# Patient Record
Sex: FEMALE | Race: WHITE | NOT HISPANIC OR LATINO | URBAN - METROPOLITAN AREA
[De-identification: names, ages, dates, MRNs, and addresses within clinical notes are randomized per-mention and may not be internally consistent; named-entity substitution may affect disease eponyms.]

---

## 2021-07-27 ENCOUNTER — INPATIENT (INPATIENT)
Facility: HOSPITAL | Age: 26
LOS: 0 days | Discharge: HOME | DRG: 607 | End: 2021-07-28
Attending: STUDENT IN AN ORGANIZED HEALTH CARE EDUCATION/TRAINING PROGRAM | Admitting: STUDENT IN AN ORGANIZED HEALTH CARE EDUCATION/TRAINING PROGRAM
Payer: COMMERCIAL

## 2021-07-27 VITALS
WEIGHT: 139.99 LBS | RESPIRATION RATE: 18 BRPM | HEART RATE: 91 BPM | SYSTOLIC BLOOD PRESSURE: 138 MMHG | HEIGHT: 65 IN | OXYGEN SATURATION: 98 % | DIASTOLIC BLOOD PRESSURE: 92 MMHG | TEMPERATURE: 98 F

## 2021-07-27 DIAGNOSIS — T78.40XA ALLERGY, UNSPECIFIED, INITIAL ENCOUNTER: ICD-10-CM

## 2021-07-27 DIAGNOSIS — E89.0 POSTPROCEDURAL HYPOTHYROIDISM: ICD-10-CM

## 2021-07-27 DIAGNOSIS — R63.8 OTHER SYMPTOMS AND SIGNS CONCERNING FOOD AND FLUID INTAKE: ICD-10-CM

## 2021-07-27 DIAGNOSIS — R21 RASH AND OTHER NONSPECIFIC SKIN ERUPTION: ICD-10-CM

## 2021-07-27 DIAGNOSIS — E89.0 POSTPROCEDURAL HYPOTHYROIDISM: Chronic | ICD-10-CM

## 2021-07-27 LAB
ANION GAP SERPL CALC-SCNC: 11 MMOL/L — SIGNIFICANT CHANGE UP (ref 9–16)
BASOPHILS # BLD AUTO: 0.02 K/UL — SIGNIFICANT CHANGE UP (ref 0–0.2)
BASOPHILS NFR BLD AUTO: 0.3 % — SIGNIFICANT CHANGE UP (ref 0–2)
BUN SERPL-MCNC: 7 MG/DL — SIGNIFICANT CHANGE UP (ref 7–23)
CALCIUM SERPL-MCNC: 9.7 MG/DL — SIGNIFICANT CHANGE UP (ref 8.5–10.5)
CHLORIDE SERPL-SCNC: 104 MMOL/L — SIGNIFICANT CHANGE UP (ref 96–108)
CO2 SERPL-SCNC: 26 MMOL/L — SIGNIFICANT CHANGE UP (ref 22–31)
CREAT SERPL-MCNC: 0.68 MG/DL — SIGNIFICANT CHANGE UP (ref 0.5–1.3)
EOSINOPHIL # BLD AUTO: 0.02 K/UL — SIGNIFICANT CHANGE UP (ref 0–0.5)
EOSINOPHIL NFR BLD AUTO: 0.3 % — SIGNIFICANT CHANGE UP (ref 0–6)
GLUCOSE SERPL-MCNC: 194 MG/DL — HIGH (ref 70–99)
HCG UR QL: NEGATIVE — SIGNIFICANT CHANGE UP
HCT VFR BLD CALC: 42.5 % — SIGNIFICANT CHANGE UP (ref 34.5–45)
HGB BLD-MCNC: 14.4 G/DL — SIGNIFICANT CHANGE UP (ref 11.5–15.5)
IMM GRANULOCYTES NFR BLD AUTO: 0.6 % — SIGNIFICANT CHANGE UP (ref 0–1.5)
LYMPHOCYTES # BLD AUTO: 0.54 K/UL — LOW (ref 1–3.3)
LYMPHOCYTES # BLD AUTO: 7.9 % — LOW (ref 13–44)
MCHC RBC-ENTMCNC: 31.9 PG — SIGNIFICANT CHANGE UP (ref 27–34)
MCHC RBC-ENTMCNC: 33.9 GM/DL — SIGNIFICANT CHANGE UP (ref 32–36)
MCV RBC AUTO: 94 FL — SIGNIFICANT CHANGE UP (ref 80–100)
MONOCYTES # BLD AUTO: 0.07 K/UL — SIGNIFICANT CHANGE UP (ref 0–0.9)
MONOCYTES NFR BLD AUTO: 1 % — LOW (ref 2–14)
NEUTROPHILS # BLD AUTO: 6.17 K/UL — SIGNIFICANT CHANGE UP (ref 1.8–7.4)
NEUTROPHILS NFR BLD AUTO: 89.9 % — HIGH (ref 43–77)
NRBC # BLD: 0 /100 WBCS — SIGNIFICANT CHANGE UP (ref 0–0)
PLATELET # BLD AUTO: 214 K/UL — SIGNIFICANT CHANGE UP (ref 150–400)
POTASSIUM SERPL-MCNC: 4 MMOL/L — SIGNIFICANT CHANGE UP (ref 3.5–5.3)
POTASSIUM SERPL-SCNC: 4 MMOL/L — SIGNIFICANT CHANGE UP (ref 3.5–5.3)
RBC # BLD: 4.52 M/UL — SIGNIFICANT CHANGE UP (ref 3.8–5.2)
RBC # FLD: 11.5 % — SIGNIFICANT CHANGE UP (ref 10.3–14.5)
SARS-COV-2 RNA SPEC QL NAA+PROBE: SIGNIFICANT CHANGE UP
SODIUM SERPL-SCNC: 141 MMOL/L — SIGNIFICANT CHANGE UP (ref 132–145)
WBC # BLD: 6.86 K/UL — SIGNIFICANT CHANGE UP (ref 3.8–10.5)
WBC # FLD AUTO: 6.86 K/UL — SIGNIFICANT CHANGE UP (ref 3.8–10.5)

## 2021-07-27 PROCEDURE — 99285 EMERGENCY DEPT VISIT HI MDM: CPT

## 2021-07-27 PROCEDURE — 70490 CT SOFT TISSUE NECK W/O DYE: CPT | Mod: 26

## 2021-07-27 PROCEDURE — 99223 1ST HOSP IP/OBS HIGH 75: CPT | Mod: GC

## 2021-07-27 RX ORDER — LEVOTHYROXINE SODIUM 125 MCG
125 TABLET ORAL DAILY
Refills: 0 | Status: DISCONTINUED | OUTPATIENT
Start: 2021-07-28 | End: 2021-07-28

## 2021-07-27 RX ORDER — ENOXAPARIN SODIUM 100 MG/ML
40 INJECTION SUBCUTANEOUS EVERY 24 HOURS
Refills: 0 | Status: DISCONTINUED | OUTPATIENT
Start: 2021-07-27 | End: 2021-07-28

## 2021-07-27 RX ORDER — PANTOPRAZOLE SODIUM 20 MG/1
40 TABLET, DELAYED RELEASE ORAL
Refills: 0 | Status: DISCONTINUED | OUTPATIENT
Start: 2021-07-27 | End: 2021-07-28

## 2021-07-27 RX ORDER — FAMOTIDINE 10 MG/ML
20 INJECTION INTRAVENOUS ONCE
Refills: 0 | Status: COMPLETED | OUTPATIENT
Start: 2021-07-27 | End: 2021-07-27

## 2021-07-27 RX ORDER — DIPHENHYDRAMINE HCL 50 MG
25 CAPSULE ORAL ONCE
Refills: 0 | Status: COMPLETED | OUTPATIENT
Start: 2021-07-27 | End: 2021-07-27

## 2021-07-27 RX ORDER — LEVOTHYROXINE SODIUM 125 MCG
0 TABLET ORAL
Qty: 0 | Refills: 0 | DISCHARGE

## 2021-07-27 RX ORDER — LEVOTHYROXINE SODIUM 125 MCG
125 TABLET ORAL
Qty: 0 | Refills: 0 | DISCHARGE

## 2021-07-27 RX ORDER — EPINEPHRINE 0.3 MG/.3ML
0.3 INJECTION INTRAMUSCULAR; SUBCUTANEOUS ONCE
Refills: 0 | Status: COMPLETED | OUTPATIENT
Start: 2021-07-27 | End: 2021-07-27

## 2021-07-27 RX ORDER — HYDROXYZINE HCL 10 MG
25 TABLET ORAL EVERY 6 HOURS
Refills: 0 | Status: DISCONTINUED | OUTPATIENT
Start: 2021-07-27 | End: 2021-07-27

## 2021-07-27 RX ORDER — HYDROXYZINE HCL 10 MG
25 TABLET ORAL EVERY 6 HOURS
Refills: 0 | Status: DISCONTINUED | OUTPATIENT
Start: 2021-07-27 | End: 2021-07-28

## 2021-07-27 RX ORDER — SODIUM CHLORIDE 9 MG/ML
1000 INJECTION INTRAMUSCULAR; INTRAVENOUS; SUBCUTANEOUS ONCE
Refills: 0 | Status: COMPLETED | OUTPATIENT
Start: 2021-07-27 | End: 2021-07-27

## 2021-07-27 RX ADMIN — Medication 25 MILLIGRAM(S): at 19:31

## 2021-07-27 RX ADMIN — Medication 1 APPLICATION(S): at 23:26

## 2021-07-27 RX ADMIN — EPINEPHRINE 0.3 MILLIGRAM(S): 0.3 INJECTION INTRAMUSCULAR; SUBCUTANEOUS at 04:25

## 2021-07-27 RX ADMIN — Medication 25 MILLIGRAM(S): at 00:54

## 2021-07-27 RX ADMIN — ENOXAPARIN SODIUM 40 MILLIGRAM(S): 100 INJECTION SUBCUTANEOUS at 23:25

## 2021-07-27 RX ADMIN — Medication 25 MILLIGRAM(S): at 17:12

## 2021-07-27 RX ADMIN — Medication 50 MILLIGRAM(S): at 00:55

## 2021-07-27 RX ADMIN — SODIUM CHLORIDE 1000 MILLILITER(S): 9 INJECTION INTRAMUSCULAR; INTRAVENOUS; SUBCUTANEOUS at 00:54

## 2021-07-27 RX ADMIN — SODIUM CHLORIDE 1000 MILLILITER(S): 9 INJECTION INTRAMUSCULAR; INTRAVENOUS; SUBCUTANEOUS at 01:55

## 2021-07-27 RX ADMIN — Medication 50 MILLIGRAM(S): at 05:21

## 2021-07-27 RX ADMIN — FAMOTIDINE 20 MILLIGRAM(S): 10 INJECTION INTRAVENOUS at 00:54

## 2021-07-27 RX ADMIN — Medication 25 MILLIGRAM(S): at 02:49

## 2021-07-27 NOTE — ED ADULT NURSE REASSESSMENT NOTE - NS ED NURSE REASSESS COMMENT FT1
At reassessment, patient has clear airway, in NAD. Persistent uticaria with hives, 25mg benadryl IV administered. Will continue to monitor.

## 2021-07-27 NOTE — H&P ADULT - HISTORY OF PRESENT ILLNESS
CC: "rash"  HPI: 26F PMH thyroid cancer s/p resection (2016) and RT p/w rash x4 days and difficulty breathing x1 day. Rash started on Friday after pt took 4x 250 mg tablets of leftover azithromycin for patches on her L eyelid and R upper lip that she thought was orofacial dermatitis. Rash started out as asymptomatic skin-colored papules on her arms and thighs, which then progressively increased in number and became more raised and red. Rash spread to chest and abdomen Sunday night/Monday and became itchy. Pt also felt "fullness" in her throat, no difficulty breathing on Monday night, so she went to urgent care, where she received a steroid shot, 6-day pack of steroids, which she started Monday night, and benadryl. Rash improved but then recurred, so pt went to [facility] ED. Pt denies any drug or food allergies, previously took azithromycin without problems. No history of similar rash or difficulty breathing. Moved to [facility] from [place] in June, denies any recent travel or outdoor exposure. No new lotions or detergent, medications, or supplements. Ate pasta w/ shrimp last night, turkey wrap on Friday, denies any new or unusual foods.     ROS notable for 1 episode of diarrhea on Friday after taking abx, last BM earlier today was normal. Also notes feeling sore on Thursday and Friday, took advil, no soreness yesterday. No joint pains.    Denies fevers, chills, cough, chest pain, dyspnea, nausea, headache, sick contacts, change in urinary habits.      In the ED:    - VS: Tmax: 36.8, HR: 91, BP: 138/92, RR: 18, O2: 98% on RA     - Pertinent Labs: Glc 194    - Imaging: CT neck soft tissue: normal exam    - Treatment/interventions: Pt received benadryl x2, famotidine, epipen x1, and solumedrol 50 mg x2 in ED.    PMHx:   PSHx:  Meds: See med rec  Allergies:  Social: see below     CC: "rash"  HPI: 26F PMH thyroid cancer s/p resection (2016) and RT p/w rash x4 days and difficulty breathing x1 day. Rash started on Friday after pt took 4x 250 mg tablets of leftover azithromycin for patches on her L eyelid and R upper lip that she thought was orofacial dermatitis. Rash started out as asymptomatic skin-colored papules on her arms and thighs, which then progressively increased in number and became more raised and red. Rash spread to chest and abdomen Sunday night/Monday and became itchy. Pt also felt "fullness" in her throat, no difficulty breathing on Monday night, so she went to urgent care, where she received a steroid shot, 6-day pack of steroids, which she started Monday night, and benadryl. Rash improved but then recurred, so pt went to [facility] ED. Pt denies any drug or food allergies, previously took azithromycin without problems. No history of similar rash or difficulty breathing. Moved to [facility] from [place] in June, denies any recent travel or outdoor exposure. No new lotions or detergent, medications, or supplements. Ate pasta w/ shrimp last night, turkey wrap on Friday, denies any new or unusual foods.     ROS notable for 1 episode of diarrhea on Friday after taking abx, last BM earlier today was normal. Also notes feeling sore on Thursday and Friday, took advil, no soreness yesterday. No joint pains.    Denies fevers, chills, cough, chest pain, dyspnea, nausea, headache, sick contacts, change in urinary habits.      In the ED:    - VS: Tmax: 36.8, HR: 91, BP: 138/92, RR: 18, O2: 98% on RA     - Pertinent Labs: Glc 194    - Imaging: CT neck soft tissue: normal exam    - Treatment/interventions: Pt received benadryl x2, famotidine, epipen x1, solumedrol 50 mg x2, and 1L NS in ED.    PMHx:   PSHx:  Meds: See med rec  Allergies:  Social: see below

## 2021-07-27 NOTE — ED PROVIDER NOTE - ENMT, MLM
Airway patent, Nasal mucosa clear. Mouth with normal mucosa. Throat has no vesicles, no oropharyngeal exudates and uvula is midline. no angioedema

## 2021-07-27 NOTE — ED ADULT NURSE REASSESSMENT NOTE - GENERAL PATIENT STATE
comfortable appearance/cooperative/resting/sleeping
comfortable appearance/cooperative/resting/sleeping
cooperative

## 2021-07-27 NOTE — ED ADULT NURSE REASSESSMENT NOTE - NS ED NURSE REASSESS COMMENT FT1
Pt reporting relief from pruritus, redness noted to have decreased to hives to b/l feet and BUE. Pt reporting relief from pruritus, redness noted to have decreased to hives to b/l feet and BUE's.

## 2021-07-27 NOTE — CONSULT NOTE ADULT - ASSESSMENT
A/p: Suspect erythema multiforme like drug reaction 2/2 large dose of azithromycin  -Would check LFTs to r/o DRESS, though less likely as no facial swelling and no eosinophilia on CBC  -Would rec check Mycoplasma pneumoniae antibodies, IgG, IgM, as patient described body aches and malaise prior to onset of rash and this has been associated with EM like outbreaks.   -Would Rx 1 lb jar of triamcinolone, apply to affected area BID x 1 week. Avoid face, groin, underarms. Can continue pepcid and benadryl for a few days.   -Avoid ibuprofen and azithromycin going forward, consider allergy referral for ibuprofen in the future.   -Call for reevaluation if mucous membrane involvement, progression/worsening of rash, pain in skin.   -Can f/u with derm as outpatient.

## 2021-07-27 NOTE — ED ADULT NURSE REASSESSMENT NOTE - NS ED NURSE REASSESS COMMENT FT1
Pt aox3 breathing is even and unlabored. hives present on b/l arms, neck and chest. Pt denies throat discomfort.

## 2021-07-27 NOTE — H&P ADULT - PROBLEM SELECTOR PLAN 1
Unknown inciting agent, most likely azithromycin, as pt's rash started after she took her remaining 4 pills. Pt's throat symptoms started 3 days after rash, unclear if related or separate inciting agent.   -s/p IM steroids and 40 mg medrol last night at urgent care   -s/p 50 mg solumedrol x2, epipen x1, IV benedryl x2 in ED    #Rash - most likely drug-induced given clinical history. Not urticaria, as rash has persisted beyond 24 hrs. Other possible etiologies include viral exanthem vs less likely contact dermatitis.   -hydroxyzine 25 mg q6h for itching  -consider topical triamcinolone 0.025% cream 1-2 times a day PRN itching Unknown inciting agent, most likely azithromycin, as pt's rash started after she took her remaining 4 pills. Pt's throat symptoms started 3 days after rash, unclear if related or separate inciting agent.   -s/p IM steroids and 40 mg medrol last night at urgent care   -s/p 50 mg solumedrol x2, epipen x1, IV benedryl x2 in ED  -c/w 40 mg PO medrol for 3 days (total 4d)    #Rash - most likely drug-induced given clinical history. Not urticaria, as rash has persisted beyond 24 hrs. Other possible etiologies include viral exanthem vs less likely contact dermatitis.   -hydroxyzine 25 mg q6h for itching  -consider topical triamcinolone 0.025% cream 1-2 times a day PRN itching Unknown inciting agent, most likely azithromycin, as pt's rash started after she took her remaining 4 pills. Pt's throat symptoms started 3 days after rash, unclear if related or separate inciting agent.   -s/p IM steroids and 40 mg medrol last night at urgent care   -s/p 50 mg solumedrol x2, epipen x1, IV benedryl x2 in ED  -c/w 40 mg PO medrol for 3 days (total 4d)    #Rash - most likely drug-induced given clinical history. Not urticaria, as rash has persisted beyond 24 hrs. Other possible etiologies include viral exanthem vs less likely contact dermatitis.   -hydroxyzine 25 mg q6h for itching  -derm consulted, f/u derm recs

## 2021-07-27 NOTE — ED ADULT NURSE NOTE - CHIEF COMPLAINT QUOTE
Pt has no hx known allergies, reports taking unprescribed azithromycin at home Friday for "dry spot" to right eye, began to have body hives, went to  today, given prednisone, and unknown steroid. Took benadryl at home. States hives improved, then worsened after eating dinner. Pt admits to airway "tightness", denies chest pain. Able to swallow and speak w/out difficulty. No drooling noted.

## 2021-07-27 NOTE — ED ADULT NURSE NOTE - OBJECTIVE STATEMENT
Pt has no hx known allergies, reports taking unprescribed azithromycin at home Friday for "dry spot" to right eye, began to have body hives, went to  today, given prednisone, and unknown steroid. Took benadryl at home. States hives improved, then worsened after eating dinner. Hives noted to all extremities, chest and abdomin. Pt admits to airway "tightness", denies chest pain. Able to swallow and speak w/out difficulty. No drooling noted.

## 2021-07-27 NOTE — H&P ADULT - NSHPSOCIALHISTORY_GEN_ALL_CORE
Single, never , not currently seeing anyone. Moved to [facility] in June 2021. Works at [employer] as a . Never smoker, drinks 6-8 drinks/wk mostly on Friday and Saturday. Denies current and past use of other recreational drugs. Single, never , not currently seeing anyone. Moved to [facility] in June 2021. Works at [employer] as a . Never smoker, drinks 6-8 drinks/wk mostly on Friday and Saturday. Denies current and past use of other recreational drugs. Has not been sexually active in some time.

## 2021-07-27 NOTE — ED PROVIDER NOTE - OBJECTIVE STATEMENT
25 yo F, no pmhx, presents with diffuse hives, itching rash, and throat discomfort after eating dinner this evening. patient was seen at urgent care at noon yesterday and given IM injection of steroid, and medrol dose pack which she started this evening, and benadryl. hives had subsided but then returned. notes mild itchiness, and fullness with discomfort to throat. denies N/V/D, denies sob or syncope. denies wheezing. denies similar reactions in the past.

## 2021-07-27 NOTE — H&P ADULT - NSICDXFAMILYHX_GEN_ALL_CORE_FT
FAMILY HISTORY:  Father  Still living? Yes, Estimated age: Age Unknown  FH: lymphoma, Age at diagnosis: Age Unknown    Mother  Still living? Yes, Estimated age: Age Unknown  FH: thyroid cancer, Age at diagnosis: Age Unknown    Grandparent  Still living? No  FH: breast cancer, Age at diagnosis: Age Unknown    Aunt  Still living? Unknown  FH: thyroid cancer, Age at diagnosis: Age Unknown

## 2021-07-27 NOTE — H&P ADULT - ASSESSMENT
26F PMH thyroid cancer s/p resection (2016) and RT, no history of allergic reactions p/w rash x4 days and difficulty breathing x1 day.  26F PMH thyroid cancer s/p resection (2016) and RT, no history of allergic reaction p/w rash x4 days and difficulty breathing x1 day.

## 2021-07-27 NOTE — ED PROVIDER NOTE - PROGRESS NOTE DETAILS
patient with persistant hives, and worsening of allergic reaction symtpoms, no airway involvement at this time. accepted to [facility] spoke to telemedicine consultation as transfer center recommended tele med consult there is no bed for 4 hours, no hospitalist available at this time. patient with persistent hives, and worsening of allergic reaction symptoms, no airway involvement at this time. accepted to [facility] pt stable -  waiting for transport to [facility]

## 2021-07-27 NOTE — H&P ADULT - ATTENDING COMMENTS
Patient was seen and examined with the resident team today.  I agree with Dr. You's assessment and plan with the following exceptions/additions:     Briefly, this is a 25yo woman with a PMH of thyroid cancer s/p resection and RT (2016) who p/w a pruritic, maculopapular rash after consuming 1g of Azithromycin out of concerns of an R-eyelid, L-upper lip dermatitis.  Initially she presented to the ED and was discharged with steroids but return after worsening of her rash associated with throat tingling.  [facility]  Admitted for supportive care.     #Drug Rash - steroids and benadryl; can substitute benadryl for Atarax if pt does not find relief with the former; please list Azithro as an official allergy and refer to an Allergist for confirmation; f/u official Derm recs  #Hx of thyroid CA - family trait; c/w home Synthroid   #DVT PPx - Lovenox  #Dispo - home likely tomorrow    Kami Jansen  [phone]

## 2021-07-27 NOTE — CONSULT NOTE ADULT - SUBJECTIVE AND OBJECTIVE BOX
S: Asked to consult on this 25 yo F who was admitted on 7/27 for a generalized rash and concern for breathing difficulties.   Pt states that on thursday, friday of last week she had some body aches and took advil, which she was used to taking approx 1x a month for generalized aches and pains.   She then noticed some scaling above her lip and on her eyebrow on friday, and took 1000 mg (250 x 4) azithromycin, which she had at home, in concern for periorificial dermatitis.  She began developing a rash on saturday initially seen at urgent care, given IMK and then presented to [facility], thinks she felt some fullness in her throat, admitted approx midnight on monday night.   Given IV solumedrol, pepcid, epipen, benadryl.   Pt states she is not sure she had any breathing difficulties. Currently feels ok, skin feels somewhat swollen/warm but not painful. Denies difficulty swallowing, pain on urination, pain with blinking, pain with defecation.       O:   WDWN, A03, appears stated age, tearful in bed.   Pt with erythematous blanchable, coalescing thin papules and plaques on the trunk and extremities, sparing the face  Some skin lesions appear targetoid and her palms and soles are affected.   no e/o eosinophilia on CBC

## 2021-07-27 NOTE — ED ADULT NURSE NOTE - NSIMPLEMENTINTERV_GEN_ALL_ED
Implemented All Universal Safety Interventions:  [place] to call system. Call bell, personal items and telephone within reach. Instruct patient to call for assistance. Room bathroom lighting operational. Non-slip footwear when patient is off stretcher. Physically safe environment: no spills, clutter or unnecessary equipment. Stretcher in lowest position, wheels locked, appropriate side rails in place.

## 2021-07-27 NOTE — ED ADULT NURSE NOTE - CHPI ED NUR SYMPTOMS NEG
no congestion/no difficulty breathing/no difficulty swallowing/no nausea/no rash/no shortness of breath/no swelling of face, tongue/no vomiting/no wheezing

## 2021-07-27 NOTE — H&P ADULT - NSHPPHYSICALEXAM_GEN_ALL_CORE
GENERAL: female appearing her age lying in bed, appears somewhat uncomfortable but NAD  HEAD:  Atraumatic, Normocephalic  EYES: EOMI, PERRLA, conjunctiva and sclera clear.  ENT: Moist mucous membranes, no oral lesion.   NECK: Supple, No JVD  CHEST/LUNG: Clear to auscultation bilaterally; No rales, rhonchi, wheezing, or rubs. Unlabored respirations  HEART: Regular rate and rhythm; No murmurs, rubs, or gallops  ABDOMEN: BSx4; Soft, nontender, nondistended  EXTREMITIES:  2+ Peripheral Pulses, brisk capillary refill. No clubbing, cyanosis, or edema  NERVOUS SYSTEM:  A&Ox3, no focal deficits   SKIN: Confluent blanching erythematous papules and patches on b/l upper extremities, b/l lower extremities, abdomen, and back, some overlying excoriation on papules on arms. Few erythematous papules on L palm. Mild scaling on L eyelid and right upper lid. GENERAL: female appearing her age lying in bed, appears somewhat uncomfortable but NAD  HEAD:  Atraumatic, Normocephalic  EYES: EOMI, PERRLA, conjunctiva and sclera clear.  ENT: Moist mucous membranes, no oral lesion.   NECK: Supple, No JVD  CHEST/LUNG: Clear to auscultation bilaterally; No rales, rhonchi, wheezing, or rubs. Unlabored respirations  HEART: Regular rate and rhythm; No murmurs, rubs, or gallops  ABDOMEN: BSx4; Soft, nontender, nondistended  EXTREMITIES:  2+ Peripheral Pulses, brisk capillary refill. No clubbing, cyanosis, or edema  NERVOUS SYSTEM:  A&Ox3, no focal deficits   SKIN: Blanching erythematous papules coalescing into larger confluent patches on b/l upper extremities, b/l lower extremities, abdomen, and back, some overlying excoriation on papules on arms. Few erythematous papules on L palm. Mild scaling on L eyelid and right upper lid. No dermatographism  PSYCH: pleasant, appropriate affect

## 2021-07-27 NOTE — ED PROVIDER NOTE - CLINICAL SUMMARY MEDICAL DECISION MAKING FREE TEXT BOX
IV placed, started IV benadryl with pepcid and 50 mg solumedrol, does not require epi at this time, protecting airway, stable vs, well appearing, will continue to monitor for recurrence.

## 2021-07-27 NOTE — ED ADULT NURSE REASSESSMENT NOTE - NS ED NURSE REASSESS COMMENT FT1
Received pt from lunch relief RN. Pt reporting worsening redness to hives and increased pruritus. Pt denies chest pain or SOB. ED Provider Cookie made aware.

## 2021-07-27 NOTE — H&P ADULT - PROBLEM SELECTOR PLAN 3
F: none, pt eating and drinking  E: replete as needed  N: regular diet     DVT PPX: lovenox  GI PPX: pantoprazole    Code status: FULL CODE    Dispo: [id number]

## 2021-07-28 VITALS
HEART RATE: 62 BPM | DIASTOLIC BLOOD PRESSURE: 88 MMHG | RESPIRATION RATE: 18 BRPM | SYSTOLIC BLOOD PRESSURE: 136 MMHG | OXYGEN SATURATION: 99 % | TEMPERATURE: 98 F

## 2021-07-28 LAB
ALBUMIN SERPL ELPH-MCNC: 3.7 G/DL — SIGNIFICANT CHANGE UP (ref 3.3–5)
ALBUMIN SERPL ELPH-MCNC: 4.1 G/DL — SIGNIFICANT CHANGE UP (ref 3.3–5)
ALP SERPL-CCNC: 128 U/L — HIGH (ref 40–120)
ALP SERPL-CCNC: 133 U/L — HIGH (ref 40–120)
ALT FLD-CCNC: 176 U/L — HIGH (ref 10–45)
ALT FLD-CCNC: 178 U/L — HIGH (ref 10–45)
ANION GAP SERPL CALC-SCNC: 7 MMOL/L — SIGNIFICANT CHANGE UP (ref 5–17)
AST SERPL-CCNC: 40 U/L — SIGNIFICANT CHANGE UP (ref 10–40)
AST SERPL-CCNC: 44 U/L — HIGH (ref 10–40)
BASOPHILS # BLD AUTO: 0.06 K/UL — SIGNIFICANT CHANGE UP (ref 0–0.2)
BASOPHILS # BLD AUTO: 0.07 K/UL — SIGNIFICANT CHANGE UP (ref 0–0.2)
BASOPHILS NFR BLD AUTO: 0.5 % — SIGNIFICANT CHANGE UP (ref 0–2)
BASOPHILS NFR BLD AUTO: 0.5 % — SIGNIFICANT CHANGE UP (ref 0–2)
BILIRUB DIRECT SERPL-MCNC: <0.2 MG/DL — SIGNIFICANT CHANGE UP (ref 0–0.2)
BILIRUB INDIRECT FLD-MCNC: SIGNIFICANT CHANGE UP MG/DL (ref 0.2–1)
BILIRUB SERPL-MCNC: 0.4 MG/DL — SIGNIFICANT CHANGE UP (ref 0.2–1.2)
BILIRUB SERPL-MCNC: 0.4 MG/DL — SIGNIFICANT CHANGE UP (ref 0.2–1.2)
BUN SERPL-MCNC: 9 MG/DL — SIGNIFICANT CHANGE UP (ref 7–23)
CALCIUM SERPL-MCNC: 8.8 MG/DL — SIGNIFICANT CHANGE UP (ref 8.4–10.5)
CHLORIDE SERPL-SCNC: 105 MMOL/L — SIGNIFICANT CHANGE UP (ref 96–108)
CO2 SERPL-SCNC: 28 MMOL/L — SIGNIFICANT CHANGE UP (ref 22–31)
CREAT SERPL-MCNC: 0.62 MG/DL — SIGNIFICANT CHANGE UP (ref 0.5–1.3)
EOSINOPHIL # BLD AUTO: 0.73 K/UL — HIGH (ref 0–0.5)
EOSINOPHIL # BLD AUTO: 1.09 K/UL — HIGH (ref 0–0.5)
EOSINOPHIL NFR BLD AUTO: 5.5 % — SIGNIFICANT CHANGE UP (ref 0–6)
EOSINOPHIL NFR BLD AUTO: 7.8 % — HIGH (ref 0–6)
GLUCOSE SERPL-MCNC: 84 MG/DL — SIGNIFICANT CHANGE UP (ref 70–99)
HCT VFR BLD CALC: 41 % — SIGNIFICANT CHANGE UP (ref 34.5–45)
HCT VFR BLD CALC: 43.2 % — SIGNIFICANT CHANGE UP (ref 34.5–45)
HGB BLD-MCNC: 13.1 G/DL — SIGNIFICANT CHANGE UP (ref 11.5–15.5)
HGB BLD-MCNC: 13.9 G/DL — SIGNIFICANT CHANGE UP (ref 11.5–15.5)
IMM GRANULOCYTES NFR BLD AUTO: 0.5 % — SIGNIFICANT CHANGE UP (ref 0–1.5)
IMM GRANULOCYTES NFR BLD AUTO: 0.6 % — SIGNIFICANT CHANGE UP (ref 0–1.5)
LYMPHOCYTES # BLD AUTO: 1.32 K/UL — SIGNIFICANT CHANGE UP (ref 1–3.3)
LYMPHOCYTES # BLD AUTO: 10 % — LOW (ref 13–44)
LYMPHOCYTES # BLD AUTO: 2.81 K/UL — SIGNIFICANT CHANGE UP (ref 1–3.3)
LYMPHOCYTES # BLD AUTO: 20.2 % — SIGNIFICANT CHANGE UP (ref 13–44)
MCHC RBC-ENTMCNC: 30.7 PG — SIGNIFICANT CHANGE UP (ref 27–34)
MCHC RBC-ENTMCNC: 31.2 PG — SIGNIFICANT CHANGE UP (ref 27–34)
MCHC RBC-ENTMCNC: 32 GM/DL — SIGNIFICANT CHANGE UP (ref 32–36)
MCHC RBC-ENTMCNC: 32.2 GM/DL — SIGNIFICANT CHANGE UP (ref 32–36)
MCV RBC AUTO: 96 FL — SIGNIFICANT CHANGE UP (ref 80–100)
MCV RBC AUTO: 96.9 FL — SIGNIFICANT CHANGE UP (ref 80–100)
MONOCYTES # BLD AUTO: 0.35 K/UL — SIGNIFICANT CHANGE UP (ref 0–0.9)
MONOCYTES # BLD AUTO: 1.01 K/UL — HIGH (ref 0–0.9)
MONOCYTES NFR BLD AUTO: 2.6 % — SIGNIFICANT CHANGE UP (ref 2–14)
MONOCYTES NFR BLD AUTO: 7.3 % — SIGNIFICANT CHANGE UP (ref 2–14)
NEUTROPHILS # BLD AUTO: 10.69 K/UL — HIGH (ref 1.8–7.4)
NEUTROPHILS # BLD AUTO: 8.84 K/UL — HIGH (ref 1.8–7.4)
NEUTROPHILS NFR BLD AUTO: 63.6 % — SIGNIFICANT CHANGE UP (ref 43–77)
NEUTROPHILS NFR BLD AUTO: 80.9 % — HIGH (ref 43–77)
NRBC # BLD: 0 /100 WBCS — SIGNIFICANT CHANGE UP (ref 0–0)
NRBC # BLD: 0 /100 WBCS — SIGNIFICANT CHANGE UP (ref 0–0)
PLATELET # BLD AUTO: 260 K/UL — SIGNIFICANT CHANGE UP (ref 150–400)
PLATELET # BLD AUTO: 284 K/UL — SIGNIFICANT CHANGE UP (ref 150–400)
POTASSIUM SERPL-MCNC: 3.8 MMOL/L — SIGNIFICANT CHANGE UP (ref 3.5–5.3)
POTASSIUM SERPL-SCNC: 3.8 MMOL/L — SIGNIFICANT CHANGE UP (ref 3.5–5.3)
PROT SERPL-MCNC: 6 G/DL — SIGNIFICANT CHANGE UP (ref 6–8.3)
PROT SERPL-MCNC: 6.8 G/DL — SIGNIFICANT CHANGE UP (ref 6–8.3)
RBC # BLD: 4.27 M/UL — SIGNIFICANT CHANGE UP (ref 3.8–5.2)
RBC # BLD: 4.46 M/UL — SIGNIFICANT CHANGE UP (ref 3.8–5.2)
RBC # FLD: 11.9 % — SIGNIFICANT CHANGE UP (ref 10.3–14.5)
RBC # FLD: 11.9 % — SIGNIFICANT CHANGE UP (ref 10.3–14.5)
SODIUM SERPL-SCNC: 140 MMOL/L — SIGNIFICANT CHANGE UP (ref 135–145)
WBC # BLD: 13.22 K/UL — HIGH (ref 3.8–10.5)
WBC # BLD: 13.91 K/UL — HIGH (ref 3.8–10.5)
WBC # FLD AUTO: 13.22 K/UL — HIGH (ref 3.8–10.5)
WBC # FLD AUTO: 13.91 K/UL — HIGH (ref 3.8–10.5)

## 2021-07-28 PROCEDURE — 99285 EMERGENCY DEPT VISIT HI MDM: CPT | Mod: 25

## 2021-07-28 PROCEDURE — 99239 HOSP IP/OBS DSCHRG MGMT >30: CPT | Mod: GC

## 2021-07-28 PROCEDURE — 96375 TX/PRO/DX INJ NEW DRUG ADDON: CPT

## 2021-07-28 PROCEDURE — 96376 TX/PRO/DX INJ SAME DRUG ADON: CPT

## 2021-07-28 PROCEDURE — 96372 THER/PROPH/DIAG INJ SC/IM: CPT | Mod: XU

## 2021-07-28 PROCEDURE — 36415 COLL VENOUS BLD VENIPUNCTURE: CPT

## 2021-07-28 PROCEDURE — 70490 CT SOFT TISSUE NECK W/O DYE: CPT

## 2021-07-28 PROCEDURE — 87635 SARS-COV-2 COVID-19 AMP PRB: CPT

## 2021-07-28 PROCEDURE — 96374 THER/PROPH/DIAG INJ IV PUSH: CPT

## 2021-07-28 PROCEDURE — 80076 HEPATIC FUNCTION PANEL: CPT

## 2021-07-28 PROCEDURE — 81025 URINE PREGNANCY TEST: CPT

## 2021-07-28 PROCEDURE — 85025 COMPLETE CBC W/AUTO DIFF WBC: CPT

## 2021-07-28 PROCEDURE — 80048 BASIC METABOLIC PNL TOTAL CA: CPT

## 2021-07-28 PROCEDURE — 80053 COMPREHEN METABOLIC PANEL: CPT

## 2021-07-28 PROCEDURE — 86738 MYCOPLASMA ANTIBODY: CPT

## 2021-07-28 RX ORDER — NORETHINDRONE AND ETHINYL ESTRADIOL 0.4-0.035
0 KIT ORAL
Qty: 0 | Refills: 0 | DISCHARGE

## 2021-07-28 RX ORDER — LEVOTHYROXINE SODIUM 125 MCG
1 TABLET ORAL
Qty: 0 | Refills: 0 | DISCHARGE

## 2021-07-28 RX ORDER — DEXTROAMPHETAMINE SACCHARATE, AMPHETAMINE ASPARTATE, DEXTROAMPHETAMINE SULFATE AND AMPHETAMINE SULFATE 1.875; 1.875; 1.875; 1.875 MG/1; MG/1; MG/1; MG/1
1 TABLET ORAL
Qty: 0 | Refills: 0 | DISCHARGE

## 2021-07-28 RX ORDER — NORETHINDRONE AND ETHINYL ESTRADIOL 0.4-0.035
1 KIT ORAL
Qty: 0 | Refills: 0 | DISCHARGE

## 2021-07-28 RX ORDER — PANTOPRAZOLE SODIUM 20 MG/1
1 TABLET, DELAYED RELEASE ORAL
Qty: 14 | Refills: 0
Start: 2021-07-28 | End: 2021-08-10

## 2021-07-28 RX ORDER — DIPHENHYDRAMINE HCL 50 MG
50 CAPSULE ORAL EVERY 6 HOURS
Refills: 0 | Status: DISCONTINUED | OUTPATIENT
Start: 2021-07-28 | End: 2021-07-28

## 2021-07-28 RX ADMIN — Medication 125 MICROGRAM(S): at 06:20

## 2021-07-28 RX ADMIN — PANTOPRAZOLE SODIUM 40 MILLIGRAM(S): 20 TABLET, DELAYED RELEASE ORAL at 06:20

## 2021-07-28 RX ADMIN — Medication 1 APPLICATION(S): at 06:20

## 2021-07-28 RX ADMIN — Medication 40 MILLIGRAM(S): at 07:21

## 2021-07-28 RX ADMIN — Medication 50 MILLIGRAM(S): at 07:21

## 2021-07-28 NOTE — DISCHARGE NOTE PROVIDER - NSDCCPCAREPLAN_GEN_ALL_CORE_FT
PRINCIPAL DISCHARGE DIAGNOSIS  Diagnosis: Allergic reaction  Assessment and Plan of Treatment: You were diagnosed with a likely drug reaction to azithromycin. We treated your allergic reaction with IV and oral steroids, epipen, and benadryl. Your rash was treated with a topical steroid cream. Please get your bloodwork within 3 days and follow up with the dermatologist who saw you here within 1 week. Please also avoid azithromycin and NSAIDS like ipuprofen/advil.      SECONDARY DISCHARGE DIAGNOSES  Diagnosis: S/P thyroidectomy  Assessment and Plan of Treatment: You received your home synthroid while you were admitted. Please continue to take your home synthroid as prescribed and follow up with your endocrinologist as usual.

## 2021-07-28 NOTE — DISCHARGE NOTE NURSING/CASE MANAGEMENT/SOCIAL WORK - NSDCFUADDAPPT_GEN_ALL_CORE_FT
We made follow up appointment with Dr. Kleinerman on Friday (7/30) at 1:30PM. Please call Dr. Kleinerman's office at [phone] to reschedule a follow up appointment within one week.     We also recommend that you follow up with an allergist.

## 2021-07-28 NOTE — DISCHARGE NOTE PROVIDER - HOSPITAL COURSE
#Discharge: do not delete    Patient is a 27 yo F with past medical history of thryoid cancer s/p resection (2016) and RT  Presented with rash x4 days and difficulty breathing x1, found to have likely erythema multiforme like drug reaction 2/2 azithromycin.   Problem List/Main Diagnoses (system-based):     #Allergic reaction: patient was treated with 100 mg IV solumedrol, 40 mg PO, epinephrine 0.3 mg IM, and benadryl. Labs were notable for transaminitis <200 and eosinophilia to 1.09%.      #erythema multiforme like drug reaction: patient was given topical triamcinolone 0.1% cream    #s/p thyroidectomy: patient received her home dose of synthroid.    New medications: prednisone 40 mg w/ 8-day taper, pantoprazole 40 mg, triamcinolone 0.1% cream    Labs to be followed outpatient: CBC w/ diff, CMP  Exam to be followed outpatient: None    #Discharge: do not delete    Patient is a 27 yo F with past medical history of thryoid cancer s/p resection (2016) and RT  Presented with rash x4 days and difficulty breathing x1, found to have likely erythema multiforme like drug reaction 2/2 azithromycin.   Problem List/Main Diagnoses (system-based):     #Allergic reaction: patient was treated with 100 mg IV solumedrol, 40 mg PO, epinephrine 0.3 mg IM, and benadryl. Labs were notable for transaminitis <200 and eosinophilia to 1.09%.      #erythema multiforme like drug reaction: patient was given topical triamcinolone 0.1% cream    #s/p thyroidectomy: patient received her home dose of synthroid.    New medications: prednisone 40 mg w/ 8-day taper, pantoprazole 40 mg, triamcinolone 0.1% cream    Labs to be followed outpatient: CBC w/ diff, CMP  Exam to be followed outpatient: None         ******************************************************  ATTENDING ATTESTATION    I have read and agree with the resident Discharge Note above. Patient seen and discussed with resident team on the day of discharge.     Briefly,    26 year old woman with hx of thyroid cancer s/p resection and radiotherapy (2016), who presented with a pruritic, maculopapular rash after taking 1g Azithromycin (leftover abx patient had from 2019) for right-eyelid, left upper lip dermatitis.   Had presented to urgent care on Monday, and was prescribed steroids and benadryl.   Decided to come back to ED when rash worsened, and patient experienced some 'throat tingling'.  CT neck at [facility] showed no significant soft tissue edema.   Continued on steroids here at [facility].   Rash today appears improved vs prior report. Liver tests (alk phos, AST, ALT) only mildly elevated, and have plateaued.   Leukocytosis today likely 2/2 high dose steroids.     Patient is well-appearing, feels "completely fine" and eager to return home.   Steroid taper, pantoprazole, and topical steroid per housestaff note above. Repeat labs outpatient. Will f/u with derm outpatient.      Physical Exam:    Gen: sitting upright in bed at time of exam  HEENT: NCAT, MMM, clear OP  Neck: supple, trachea at midline  CV: RRR, +S1/S2  Pulm: adequate respiratory effort, no increased work of breathing  Abd: soft, NTND, non obese  Skin: warm and dry, Blanching erythematous papules coalescing into larger confluent patches on b/l upper extremities, b/l lower extremities, --- evaluated at bedside with housestaff who had evaluated patient yesterday. Improved vs prior day's exam.   Ext: WWP, no LE edema.   Neuro: AOx3, no gross focal neurological deficits  Psych: affect and behavior appropriate, pleasant.     I was physically present for the evaluation and management services provided. I agree with the included history, physical, and plan which I reviewed and edited where appropriate. I spent > 30 minutes with the patient and the patient's family on direct patient care and discharge planning with more than 50% of the visit spent on counseling and/or coordination of care. #Discharge: do not delete    Patient is a 25 yo F with past medical history of thryoid cancer s/p resection (2016) and RT  Presented with rash x4 days and difficulty breathing x1, found to have likely erythema multiforme like drug reaction 2/2 azithromycin.   Problem List/Main Diagnoses (system-based):     #Allergic reaction: patient was treated with 100 mg IV solumedrol, 40 mg PO, epinephrine 0.3 mg IM, and benadryl. Labs were notable for transaminitis <200 and eosinophilia to 1.09%.      #erythema multiforme like drug reaction: patient was given topical triamcinolone 0.1% cream    #s/p thyroidectomy: patient received her home dose of synthroid.    New medications: prednisone 40 mg w/ 8-day taper, pantoprazole 40 mg, triamcinolone 0.1% cream    Labs to be followed outpatient: CBC w/ diff, CMP  Exam to be followed outpatient: None         ******************************************************  ATTENDING ATTESTATION    I have read and agree with the resident Discharge Note above. Patient seen and discussed with resident team on the day of discharge.     Briefly,    26 year old woman with hx of thyroid cancer s/p resection and radiotherapy (2016), who presented with a pruritic, maculopapular rash after taking 1g Azithromycin (leftover abx patient had from 2019) for right-eyelid, left upper lip dermatitis.   Had presented to urgent care on Monday, and was prescribed steroids and benadryl.   Decided to come back to ED when rash worsened, and patient experienced some 'throat tingling'.  CT neck at [facility] showed no significant soft tissue edema.   Continued on steroids here at [facility].   Rash today appears improved vs prior report. Liver tests (alk phos, AST, ALT) only mildly elevated, and have plateaued. Eosinophil count downtrending.   Leukocytosis today likely 2/2 high dose steroids.     Patient is well-appearing, feels "completely fine" and eager to return home.   Steroid taper, pantoprazole, and topical steroid per housestaff note above. Repeat labs outpatient. Will f/u with derm outpatient.      Physical Exam:    Gen: sitting upright in bed at time of exam  HEENT: NCAT, MMM, clear OP  Neck: supple, trachea at midline  CV: RRR, +S1/S2  Pulm: adequate respiratory effort, no increased work of breathing  Abd: soft, NTND, non obese  Skin: warm and dry, Blanching erythematous papules coalescing into larger confluent patches on b/l upper extremities, b/l lower extremities, --- evaluated at bedside with housestaff who had evaluated patient yesterday. Improved vs prior day's exam.   Ext: WWP, no LE edema.   Neuro: AOx3, no gross focal neurological deficits  Psych: affect and behavior appropriate, pleasant.     I was physically present for the evaluation and management services provided. I agree with the included history, physical, and plan which I reviewed and edited where appropriate. I spent > 30 minutes with the patient and the patient's family on direct patient care and discharge planning with more than 50% of the visit spent on counseling and/or coordination of care. #Discharge: do not delete    Patient is a 27 yo F with past medical history of thryoid cancer s/p resection (2016) and RT  Presented with rash x4 days and difficulty breathing x1, found to have likely erythema multiforme like drug reaction 2/2 azithromycin.   Problem List/Main Diagnoses (system-based):     #Allergic reaction: patient was treated with 100 mg IV solumedrol, 40 mg PO, epinephrine 0.3 mg IM, and benadryl. Labs were notable for transaminitis <200 and eosinophilia to 1.09%.      #erythema multiforme like drug reaction: patient was given topical triamcinolone 0.1% cream    #s/p thyroidectomy: patient received her home dose of synthroid.    New medications: prednisone 40 mg w/ 8-day taper, pantoprazole 40 mg, triamcinolone 0.1% cream    Labs to be followed outpatient: CBC w/ diff, CMP  Exam to be followed outpatient: None     ******************************************************  ATTENDING ATTESTATION    I have read and agree with the resident Discharge Note above. Patient seen and discussed with resident team on the day of discharge.     Briefly,    26 year old woman with hx of thyroid cancer s/p resection and radiotherapy (2016), who presented with a pruritic, maculopapular rash after taking 1g Azithromycin (leftover abx patient had from 2019) for right-eyelid, left upper lip dermatitis.   Had presented to urgent care on Monday, and was prescribed steroids and benadryl.   Decided to come back to ED when rash worsened, and patient experienced some 'throat tingling'.  CT neck at [facility] showed no significant soft tissue edema.   Continued on steroids here at [facility].   Rash today appears improved vs prior report. Liver tests (alk phos, AST, ALT) only mildly elevated, and have plateaued. Eosinophil count downtrending.   Leukocytosis today likely 2/2 high dose steroids.     Patient is well-appearing, feels "completely fine" and eager to return home.   Steroid taper, pantoprazole, and topical steroid per housestaff note above. Repeat labs outpatient. Will f/u with derm outpatient.      Physical Exam:    Gen: sitting upright in bed at time of exam  HEENT: NCAT, MMM, clear OP  Neck: supple, trachea at midline  CV: RRR, +S1/S2  Pulm: adequate respiratory effort, no increased work of breathing  Abd: soft, NTND, non obese  Skin: warm and dry, Blanching erythematous papules coalescing into larger confluent patches on b/l upper extremities, b/l lower extremities, --- evaluated at bedside with housestaff who had evaluated patient yesterday. Improved vs prior day's exam.   Ext: WWP, no LE edema.   Neuro: AOx3, no gross focal neurological deficits  Psych: affect and behavior appropriate, pleasant.     I was physically present for the evaluation and management services provided. I agree with the included history, physical, and plan which I reviewed and edited where appropriate. I spent > 30 minutes with the patient and the patient's family on direct patient care and discharge planning with more than 50% of the visit spent on counseling and/or coordination of care.      *****Addendum for clarification of clinical documentation    # Patient with rash from Azithromycin (adverse effect)   # Rash Classified as : Generalized rash from drug taken internally.

## 2021-07-28 NOTE — DISCHARGE NOTE PROVIDER - NSDCFUADDAPPT_GEN_ALL_CORE_FT
We made follow up appointment with Dr. Kleinerman on Friday (7/30) at 1:30PM. Please call Dr. Kleinerman's office at [phone] to reschedule a follow up appointment within one week.     We also recommend that you follow up with an allergist.

## 2021-07-28 NOTE — PROGRESS NOTE ADULT - PROBLEM SELECTOR PLAN 3
F: none, pt eating and drinking  E: replete as needed  N: regular diet     DVT PPX: lovenox  GI PPX: pantoprazole    Code status: FULL CODE    Dispo: d/c to home once LFTs downtrending

## 2021-07-28 NOTE — PROGRESS NOTE ADULT - SUBJECTIVE AND OBJECTIVE BOX
INTERVAL HPI/OVERNIGHT EVENTS:  Patient was seen and examined at bedside.     No o/n events, patient received some topical steroid last night, rash and itching has improved. Patient resting comfortably. No complaints at this time. Is hoping to go home today. Patient denies: fever, chills, dizziness, weakness, HA, Changes in vision, CP, palpitations, SOB, cough, N/V/D/C, dysuria, changes in bowel movements, LE edema. ROS otherwise negative.        VITAL SIGNS:  T(F): 97.7 (07-28-21 @ 12:02)  HR: 62 (07-28-21 @ 12:02)  BP: 136/88 (07-28-21 @ 12:02)  RR: 18 (07-28-21 @ 12:02)  SpO2: 99% (07-28-21 @ 12:02)  Wt(kg): --    PHYSICAL EXAM:    Constitutional: female appearing her stated age sitting up in bed comfortably, NAD  HEENT: PERRL, EOMI, sclera non-icteric, neck supple, trachea midline, no masses, no JVD, MMM, good dentition. No oral lesions. no cervical, preauricular, postauricular, or clavicular LAD.   Respiratory: CTA b/l, good air entry b/l, no wheezing, no rhonchi, no rales, unlabored respirations.  Cardiovascular: RRR, normal S1S2, no M/R/G  Gastrointestinal: soft, NTND, no masses palpable, BS normal  Extremities: Warm, well perfused, pulses equal bilateral upper and lower extremities, no edema, no clubbing  Neurological: AAOx3, CN Grossly intact  Skin: Erythematous thin papules coalescing into larger plaques on all four extremities and trunk, less red compared to previous day, especially on feet and dorsal arms.  Psych: appropriate affect.     MEDICATIONS  (STANDING):  enoxaparin Injectable 40 milliGRAM(s) SubCutaneous every 24 hours  levothyroxine 125 MICROGram(s) Oral daily  pantoprazole    Tablet 40 milliGRAM(s) Oral before breakfast  predniSONE   Tablet 40 milliGRAM(s) Oral every 24 hours  triamcinolone 0.1% Cream 1 Application(s) Topical two times a day    MEDICATIONS  (PRN):  diphenhydrAMINE 50 milliGRAM(s) Oral every 6 hours PRN Rash and/or Itching  hydrOXYzine hydrochloride 25 milliGRAM(s) Oral every 6 hours PRN Itching      Allergies    azithromycin (Hives; Urticaria)  ibuprofen (Hives; Urticaria)    Intolerances        LABS:                        13.1   13.91 )-----------( 260      ( 28 Jul 2021 08:01 )             41.0     07-28    140  |  105  |  9   ----------------------------<  84  3.8   |  28  |  0.62    Ca    8.8      28 Jul 2021 08:01    TPro  6.0  /  Alb  3.7  /  TBili  0.4  /  DBili  x   /  AST  40  /  ALT  176<H>  /  AlkPhos  128<H>  07-28

## 2021-07-28 NOTE — PROGRESS NOTE ADULT - PROBLEM SELECTOR PLAN 1
Unknown inciting agent, most likely azithromycin, as pt's rash started after she took her remaining 4 pills. Pt's throat symptoms started 3 days after rash, likely related.   -s/p IM steroids and 40 mg medrol last night at urgent care   -s/p 50 mg solumedrol x2, epipen x1, IV benedryl x2 in ED  -c/w 40 mg PO medrol for 3 days (total 4d), today is day 2    #Rash - per derm, most likely erythema multiforme like drug reaction vs less like DRESS, though mild transaminitis (<200) and eosinophilia   -trend LFTs  -WBC 13.1 (up from 6.86 7/27), Eo 1.09%, Abs Eo 1.09 (up from 0.02 7/27)  -hydroxyzine 25 mg q6h for itching  -topical triamcinolone 0.05% cream 20g BID PRN itching   -f/u derm recs  -pt has appt w/ Dr. Kleinerman on Friday at 1:30PM Unknown inciting agent, most likely azithromycin, as pt's rash started after she took her remaining 4 pills. Pt's throat symptoms started 3 days after rash, likely related.   -s/p IM steroids and 40 mg medrol last night at urgent care   -s/p 50 mg solumedrol x2, epipen x1, IV benedryl x2 in ED  -c/w 40 mg PO medrol for 3 days (total 4d), today is day 2    #Rash - per derm, most likely erythema multiforme like drug reaction vs less likely DRESS (criteria below), though mild transaminitis (<200) and eosinophilia   -trend LFTs  -WBC 13.1 (up from 6.86 7/27), Eo 1.09%, Abs Eo 1.09 (up from 0.02 7/27)  -hydroxyzine 25 mg q6h for itching  -topical triamcinolone 0.05% cream 20g BID PRN itching   -f/u derm recs  -RegiSCAR DRESS score 1, excluded at this time    - afebrile: -1; no LAD: 0; eos >0.7<1.5: 1; atypical lymphs unk: 0; rash not suggestive of DRESS (no facial edema, purpura, infiltration, or desquamation): -1; rash >50% BSA: 1; skin bx unk: 0; organ involvement yes, liver: 1; exclusion of other causes unk: 0     - pt to reschedule appt w/ Dr. Kleinerman (cannot do Friday 1:30PM) Unknown inciting agent, most likely azithromycin, as pt's rash started after she took her remaining 4 pills. Pt's throat symptoms started 3 days after rash, likely related.   -s/p IM steroids and 40 mg medrol last night at urgent care   -s/p 50 mg solumedrol x2, epipen x1, IV benadryl x2 in ED  -c/w 40 mg PO medrol for 3 days (total 4d), today is day 2    #Rash - per derm, most likely erythema multiforme like drug reaction vs less likely DRESS (criteria below), though mild transaminitis (<200) and eosinophilia   -trend LFTs  -WBC 13.1 (up from 6.86 7/27), Eo 1.09%, Abs Eo 1.09 (up from 0.02 7/27)  -hydroxyzine 25 mg q6h for itching  -topical triamcinolone 0.05% cream 20g BID PRN itching   -f/u derm recs  -RegiSCAR DRESS score 1, excluded at this time    - afebrile: -1; no LAD: 0; eos >0.7<1.5: 1; atypical lymphs unk: 0; rash not suggestive of DRESS (no facial edema, purpura, infiltration, or desquamation): -1; rash >50% BSA: 1; skin bx unk: 0; organ involvement yes, liver: 1; exclusion of other causes unk: 0     - pt to reschedule appt w/ Dr. Kleinerman (cannot do Friday 1:30PM)

## 2021-07-28 NOTE — PROGRESS NOTE ADULT - ASSESSMENT
26F PMH thyroid cancer s/p resection (2016) and RT, no history of allergic reaction p/w rash x4 days 2/2 ingesting 1g of azithromycin, likely erythema multiforme like rash vs DRESS.

## 2021-07-28 NOTE — DISCHARGE NOTE PROVIDER - CARE PROVIDER_API CALL
Kleinerman, Rebecca (MD)  Dermatology  [address], [address]  [place], [place] [address]  Phone: [phone]  Fax: [phone]  Follow Up Time:

## 2021-07-28 NOTE — DISCHARGE NOTE NURSING/CASE MANAGEMENT/SOCIAL WORK - PATIENT PORTAL LINK FT
You can access the FollowMyHealth Patient Portal offered by [facility] by registering at the following website: [email or web address]. By joining [employer]’s FollowMyHealth portal, you will also be able to view your health information using other applications (apps) compatible with our system.

## 2021-07-28 NOTE — DISCHARGE NOTE PROVIDER - NSDCMRMEDTOKEN_GEN_ALL_CORE_FT
BLISOVI FE 1-20 TABLET: 1 tab(s) orally once a day  pantoprazole 40 mg oral delayed release tablet: 1 tab(s) orally once a day (before a meal)  predniSONE 10 mg oral tablet: Take 4 tablets on 7/29 and 7/30. Then take 3 tablets on 7/31 and 8/1. Then take 2 tablets on 8/2 and 8/3. Then take 1 tablet on 8/4 and 8/5.  Synthroid 125 mcg (0.125 mg) oral tablet: 1 tab(s) orally once a day  triamcinolone 0.1% topical cream: Apply to rash topically 2 times a day

## 2021-07-30 LAB
M PNEUMO IGG SER IA-ACNC: 2.11 INDEX — HIGH (ref 0–0.9)
M PNEUMO IGG SER IA-ACNC: POSITIVE
M PNEUMO IGM SER-ACNC: 0.39 INDEX — SIGNIFICANT CHANGE UP (ref 0–0.9)
MYCOPLASMA AG SPEC QL: NEGATIVE — SIGNIFICANT CHANGE UP

## 2021-08-06 DIAGNOSIS — T36.3X5A ADVERSE EFFECT OF MACROLIDES, INITIAL ENCOUNTER: ICD-10-CM

## 2021-08-06 DIAGNOSIS — D72.829 ELEVATED WHITE BLOOD CELL COUNT, UNSPECIFIED: ICD-10-CM
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
## 2021-08-06 DIAGNOSIS — T38.0X5A ADVERSE EFFECT OF GLUCOCORTICOIDS AND SYNTHETIC ANALOGUES, INITIAL ENCOUNTER: ICD-10-CM

## 2021-08-06 DIAGNOSIS — L27.0 GENERALIZED SKIN ERUPTION DUE TO DRUGS AND MEDICAMENTS TAKEN INTERNALLY: ICD-10-CM

## 2021-08-06 DIAGNOSIS — R74.01 ELEVATION OF LEVELS OF LIVER TRANSAMINASE LEVELS: ICD-10-CM

## 2021-08-06 DIAGNOSIS — Z92.3 PERSONAL HISTORY OF IRRADIATION: ICD-10-CM

## 2021-08-06 DIAGNOSIS — Z85.850 PERSONAL HISTORY OF MALIGNANT NEOPLASM OF THYROID: ICD-10-CM

## 2021-08-06 DIAGNOSIS — E89.0 POSTPROCEDURAL HYPOTHYROIDISM: ICD-10-CM

## 2022-12-15 NOTE — ED PROVIDER NOTE - WET READ LAUNCH FT
[FreeTextEntry1] : 11/15/2022\par Plan;\par paint wound on left breast with betadine \par apply adhesive foam\par f/u in 4 weeks\par \par \par 12/15/22\par Plan:continue betadine foam local wound care\par followup 1 month\par  There are no Wet Read(s) to document.